# Patient Record
Sex: MALE | Race: BLACK OR AFRICAN AMERICAN | Employment: FULL TIME | URBAN - METROPOLITAN AREA
[De-identification: names, ages, dates, MRNs, and addresses within clinical notes are randomized per-mention and may not be internally consistent; named-entity substitution may affect disease eponyms.]

---

## 2022-03-18 PROBLEM — E05.90 HYPERTHYROIDISM: Status: ACTIVE | Noted: 2021-10-26

## 2022-03-19 PROBLEM — R00.2 PALPITATIONS: Status: ACTIVE | Noted: 2021-10-26

## 2022-03-20 PROBLEM — E05.00 GRAVES' DISEASE: Status: ACTIVE | Noted: 2021-10-27

## 2022-05-31 ENCOUNTER — OFFICE VISIT (OUTPATIENT)
Dept: ORTHOPEDIC SURGERY | Age: 57
End: 2022-05-31
Payer: OTHER GOVERNMENT

## 2022-05-31 VITALS — BODY MASS INDEX: 33.37 KG/M2 | WEIGHT: 260 LBS | HEIGHT: 74 IN

## 2022-05-31 DIAGNOSIS — M25.562 LEFT KNEE PAIN, UNSPECIFIED CHRONICITY: Primary | ICD-10-CM

## 2022-05-31 PROCEDURE — 99203 OFFICE O/P NEW LOW 30 MIN: CPT | Performed by: ORTHOPAEDIC SURGERY

## 2022-05-31 NOTE — PROGRESS NOTES
Name: Zana Hurtado  YOB: 1965  Gender: male  MRN: [id number]      CC: Knee Pain (L knee pain)       HPI: Zana Hurtado is a 64 y.o. male who presents with Knee Pain (L knee pain)   [facility] [place] presents today with left knee pain that began in March. He reports that he walked half a mile and then began experiencing medial sided knee pain. He does not recall any injury but does correlate the bginning of his pain with the half mile walk. He says that he has always had soreness in both knees from his time int [facility] but has not had any treatment on them. He says that his left knee is painful with ambulating and it has become difficult to walk and do day to day activities without pain. He wears a compression sleeve and takes Ibuprofen as needed. He denies a surgical history. He reports difficulty with stairs and sensation of the knee will give out. He also notes pain with twisting while swinging a golf club. ROS/Meds/PSH/PMH/FH/SH: I personally reviewed the patients standard intake form. Below are the pertinents    Tobacco:  reports that he has never smoked. He has never used smokeless tobacco.  Diabetes: none  Other: Hypertension, hyperthyroidism, hypercholesterolemia    Physical Examination:  General: no acute distress  Lungs: breathing easily  CV: regular rhythm by pulse  Left Knee: Moderate effusion present. Tenderness to palpation the medial joint line and the pes anserine bursa. Full active and passive range of motion with pain in the extreme of flexion. Negative ligamentous exam x4. Positive Danny's with reproduction of symptoms in the medial joint line. Negative bounce home test.      Imaging:   Knee XR: 4 views     Clinical Indication  1.  Left knee pain, unspecified chronicity           Report: AP, lateral, PA flexion, sunrise views of the Left knee demonstrates no acute fracture dislocation mild degenerative changes in the medial compartment    Impression: Mild DJD as above            All imaging interpreted by myself Griffin Abdalla MD independent of radiology review        Assessment:   1. Left knee pain, unspecified chronicity         Plan:   The majority of the patient's knee pain is due to a meniscal pathology. I discussed with the patient conservative treatment options to include corticosteroid injection, formal physical therapy, oral NSAIDs and advanced imaging. Based on patient's description of knee giving way, chronic swelling and clinical exam negative best to proceed with advanced imaging. I will order an MRI and he will follow-up for definitive treatment afterwards. Fay Hernandez, FARRAH dictating as a scribe for MD Ignacio Borja.  Jade Abdalla MD, [facility]

## 2022-06-01 DIAGNOSIS — E05.90 HYPERTHYROIDISM: Primary | ICD-10-CM

## 2022-06-14 DIAGNOSIS — M25.562 LEFT KNEE PAIN, UNSPECIFIED CHRONICITY: ICD-10-CM

## 2022-07-29 ENCOUNTER — TELEMEDICINE (OUTPATIENT)
Dept: ENDOCRINOLOGY | Age: 57
End: 2022-07-29
Payer: OTHER GOVERNMENT

## 2022-07-29 DIAGNOSIS — E05.90 HYPERTHYROIDISM: Primary | ICD-10-CM

## 2022-07-29 DIAGNOSIS — E05.00 GRAVES' DISEASE: ICD-10-CM

## 2022-07-29 PROCEDURE — 99213 OFFICE O/P EST LOW 20 MIN: CPT | Performed by: INTERNAL MEDICINE

## 2022-07-29 RX ORDER — BUDESONIDE AND FORMOTEROL FUMARATE DIHYDRATE 160; 4.5 UG/1; UG/1
2 AEROSOL RESPIRATORY (INHALATION) 2 TIMES DAILY
COMMUNITY
Start: 2019-12-03

## 2022-07-29 RX ORDER — DESONIDE 0.5 MG/G
CREAM TOPICAL
COMMUNITY
Start: 2022-07-06

## 2022-07-29 RX ORDER — METHIMAZOLE 10 MG/1
10 TABLET ORAL DAILY
Qty: 90 TABLET | Refills: 3
Start: 2022-07-29 | End: 2022-08-05 | Stop reason: SDUPTHER

## 2022-07-29 ASSESSMENT — ENCOUNTER SYMPTOMS
CONSTIPATION: 0
DIARRHEA: 0

## 2022-07-29 NOTE — PROGRESS NOTES
Mitchell Boyd MD, [address]            Reason for visit: Follow-up of hyperthyroidism    I was in the office while conducting this encounter. Consent:  Bere Reyes, who was evaluated through a synchronous (real-time) audio-video encounter, and/or his healthcare decision maker, is aware that it is a billable service, which includes applicable co-pays, with coverage as determined by his insurance carrier. He provided verbal consent to proceed and patient identification was verified. This visit was conducted pursuant to the emergency declaration under the [address] [place] [address], [facility] waiver authority and the [employer] Act. A caregiver was present when appropriate. Ability to conduct physical exam was limited. The patient was located at home in a state where the provider was licensed to provide care. This virtual visit was conducted via [address] Pursuant to the emergency declaration under the [address] [place] [address], [facility] authority and the [employer] Act, this Virtual  Visit was conducted to reduce the patient's risk of exposure to COVID-19 and provide continuity of care for an established patient. Services were provided through a video synchronous discussion virtually to substitute for in-person clinic visit. Due to this being a TeleHealth evaluation, many elements of the physical examination are unable to be assessed. --Ajith Santana MD on 7/29/2022 at 11:56 AM        ASSESSMENT AND PLAN:    1. Hyperthyroidism  I will have him check thyroid function at his convenience. Return in 4 to 6 months with repeat labs. - methIMAzole (TAPAZOLE) 10 MG tablet; Take 1 tablet by mouth in the morning. Dispense: 90 tablet; Refill: 3  - TSH;  Future  - T4, Free; Future  - T3; Future  - Hepatic Function Panel; Future    2. Graves' disease    Follow-up and Dispositions    Return for 4-6 months. History of Present Illness:    THYROID DYSFUNCTION  Mireille Molina is seen for follow-up of hyperthyroidism secondary to Graves' disease; this was first noted in August 2021. Current symptoms:  See review of systems below     Prior treatment: Methimazole 20 mg daily was started 10/27/2021. His dose has been adjusted as follows:  -10 mg daily 1/3/2022     Pertinent labs:  8/12/2021: TSH less than 0.005, free T4 2.50, free T3 8.0.  8/19/2021: Free T4 2.25, T4 11.3, antithyroid peroxidase antibodies 359 (+). 10/26/2021: TSH less than 0.005, free T4 2.50, T3 257, thyroid-stimulating immunoglobulin 0.77 (+). 12/30/2021: TSH less than 0.005, free T4 1.21, T3 126.  4/11/2022: TSH 2.620, free T4 1.09, T3 138. Imaging:  10/26/2021: Ultrasound ([facility])- Right lobe 2.69 x 2.24 x 6.42 cm, isthmus 0.46 cm, left lobe 1.98 x 1.95 x 5.87 cm. Heterogeneous echotexture. Increased blood flow. No nodules. Review of Systems   Constitutional:  Positive for unexpected weight change (gained 3-5 pounds in the last few months). Negative for fatigue. Cardiovascular:  Negative for palpitations. Gastrointestinal:  Negative for constipation and diarrhea. Endocrine: Positive for heat intolerance. Psychiatric/Behavioral:  Negative for sleep disturbance. There were no vitals taken for this visit. Wt Readings from Last 3 Encounters:   05/31/22 260 lb (117.9 kg)   01/03/22 262 lb (118.8 kg)   10/26/21 254 lb 9.6 oz (115.5 kg)       Physical Exam  Constitutional:       Appearance: Normal appearance. HENT:      Head: Normocephalic. Nose: Nose normal.   Eyes:      Extraocular Movements: Extraocular movements intact. Pulmonary:      Effort: Pulmonary effort is normal.   Abdominal:      General: There is no distension. Musculoskeletal:         General: Normal range of motion.       Cervical back: Normal range of motion. Skin:     Findings: No rash. Neurological:      General: No focal deficit present. Mental Status: He is alert. Mental status is at baseline. Psychiatric:         Mood and Affect: Mood normal.         Behavior: Behavior normal.       Orders Placed This Encounter   Procedures    TSH     Standing Status:   Future     Standing Expiration Date:   7/29/2023    T4, Free     Standing Status:   Future     Standing Expiration Date:   7/29/2023    T3     Standing Status:   Future     Standing Expiration Date:   7/29/2023    Hepatic Function Panel     Standing Status:   Future     Standing Expiration Date:   7/29/2023    TSH     Standing Status:   Future     Standing Expiration Date:   7/29/2023    T4, Free     Standing Status:   Future     Standing Expiration Date:   7/29/2023    T3     Standing Status:   Future     Standing Expiration Date:   7/29/2023    Hepatic Function Panel     Standing Status:   Future     Standing Expiration Date:   7/29/2023         Current Outpatient Medications   Medication Sig Dispense Refill    budesonide-formoterol (SYMBICORT) 160-4.5 MCG/ACT AERO Inhale 2 puffs into the lungs in the morning and 2 puffs before bedtime. desonide (DESOWEN) 0.05 % cream       methIMAzole (TAPAZOLE) 10 MG tablet Take 1 tablet by mouth in the morning. 90 tablet 3    calcipotriene-betamethasone (TACLONEX) 0.005-0.064 % ointment Apply to affected area twice daily; avoid face, groin, & underarms      Cholecalciferol 50 MCG (2000 UT) TABS Take by mouth      pimecrolimus (ELIDEL) 1 % cream Apply to affected areas of face, groin, and/or underarms twice daily      rosuvastatin (CRESTOR) 5 MG tablet TAKE 1 TABLET DAILY      ustekinumab (STELARA) 90 MG/ML SOSY prefilled syringe Inject 90 mg into the skin once as needed      valsartan-hydroCHLOROthiazide (DIOVAN-HCT) 160-25 MG per tablet TAKE 1 TABLET DAILY FOR HYPERTENSION       No current facility-administered medications for this visit.

## 2022-08-04 ENCOUNTER — NURSE ONLY (OUTPATIENT)
Dept: ENDOCRINOLOGY | Age: 57
End: 2022-08-04

## 2022-08-04 DIAGNOSIS — E05.90 HYPERTHYROIDISM: ICD-10-CM

## 2022-08-04 LAB
ALBUMIN SERPL-MCNC: 3.8 G/DL (ref 3.5–5)
ALBUMIN/GLOB SERPL: 1.1 (ref 1.2–3.5)
ALP SERPL-CCNC: 66 U/L (ref 50–136)
ALT SERPL-CCNC: 34 U/L (ref 12–65)
AST SERPL-CCNC: 17 U/L (ref 15–37)
BILIRUB DIRECT SERPL-MCNC: 0.4 MG/DL
BILIRUB SERPL-MCNC: 1.8 MG/DL (ref 0.2–1.1)
GLOBULIN SER CALC-MCNC: 3.4 G/DL (ref 2.3–3.5)
PROT SERPL-MCNC: 7.2 G/DL (ref 6.3–8.2)
T3 SERPL-MCNC: 1.27 NG/ML (ref 0.6–1.81)
T4 FREE SERPL-MCNC: 1.2 NG/DL (ref 0.78–1.46)
TSH, 3RD GENERATION: 1.32 UIU/ML (ref 0.36–3.74)

## 2022-08-05 DIAGNOSIS — E05.90 HYPERTHYROIDISM: ICD-10-CM

## 2022-08-05 RX ORDER — METHIMAZOLE 10 MG/1
10 TABLET ORAL DAILY
Qty: 90 TABLET | Refills: 3 | Status: SHIPPED | OUTPATIENT
Start: 2022-08-05

## 2023-09-11 ENCOUNTER — TELEPHONE (OUTPATIENT)
Dept: ENDOCRINOLOGY | Age: 58
End: 2023-09-11

## 2023-09-11 DIAGNOSIS — E05.90 HYPERTHYROIDISM: ICD-10-CM

## 2023-09-15 RX ORDER — METHIMAZOLE 10 MG/1
TABLET ORAL
Qty: 90 TABLET | Refills: 3 | OUTPATIENT
Start: 2023-09-15

## 2023-12-20 DIAGNOSIS — E05.90 HYPERTHYROIDISM: ICD-10-CM

## 2023-12-20 LAB
ALBUMIN SERPL-MCNC: 4.1 G/DL (ref 3.5–5)
ALBUMIN/GLOB SERPL: 1.3 (ref 0.4–1.6)
ALP SERPL-CCNC: 83 U/L (ref 50–136)
ALT SERPL-CCNC: 48 U/L (ref 12–65)
AST SERPL-CCNC: 31 U/L (ref 15–37)
BILIRUB DIRECT SERPL-MCNC: 0.3 MG/DL
BILIRUB SERPL-MCNC: 1.3 MG/DL (ref 0.2–1.1)
GLOBULIN SER CALC-MCNC: 3.2 G/DL (ref 2.8–4.5)
PROT SERPL-MCNC: 7.3 G/DL (ref 6.3–8.2)
T4 FREE SERPL-MCNC: 1.1 NG/DL (ref 0.78–1.46)
TSH, 3RD GENERATION: 1.08 UIU/ML (ref 0.36–3.74)

## 2023-12-21 LAB — T3 SERPL-MCNC: 1.22 NG/ML (ref 0.6–1.81)

## 2024-06-18 DIAGNOSIS — E05.90 HYPERTHYROIDISM: ICD-10-CM

## 2024-06-18 LAB
ALBUMIN SERPL-MCNC: 3.9 G/DL (ref 3.5–5)
ALBUMIN/GLOB SERPL: 1.2 (ref 1–1.9)
ALP SERPL-CCNC: 65 U/L (ref 40–129)
ALT SERPL-CCNC: 35 U/L (ref 12–65)
AST SERPL-CCNC: 30 U/L (ref 15–37)
BILIRUB DIRECT SERPL-MCNC: 0.4 MG/DL (ref 0–0.4)
BILIRUB SERPL-MCNC: 1.6 MG/DL (ref 0–1.2)
GLOBULIN SER CALC-MCNC: 3.2 G/DL (ref 2.3–3.5)
PROT SERPL-MCNC: 7.1 G/DL (ref 6.3–8.2)
T3 SERPL-MCNC: 1.26 NG/ML (ref 0.6–1.81)
T4 FREE SERPL-MCNC: 1.2 NG/DL (ref 0.9–1.7)
TSH, 3RD GENERATION: 1.91 UIU/ML (ref 0.27–4.2)

## 2024-06-20 ENCOUNTER — OFFICE VISIT (OUTPATIENT)
Dept: ENDOCRINOLOGY | Age: 59
End: 2024-06-20
Payer: OTHER GOVERNMENT

## 2024-06-20 VITALS
WEIGHT: 276 LBS | DIASTOLIC BLOOD PRESSURE: 92 MMHG | SYSTOLIC BLOOD PRESSURE: 136 MMHG | HEART RATE: 76 BPM | OXYGEN SATURATION: 96 % | BODY MASS INDEX: 35.44 KG/M2

## 2024-06-20 DIAGNOSIS — E05.00 GRAVES' DISEASE: ICD-10-CM

## 2024-06-20 DIAGNOSIS — E05.90 HYPERTHYROIDISM: Primary | ICD-10-CM

## 2024-06-20 PROCEDURE — 3080F DIAST BP >= 90 MM HG: CPT | Performed by: INTERNAL MEDICINE

## 2024-06-20 PROCEDURE — 3075F SYST BP GE 130 - 139MM HG: CPT | Performed by: INTERNAL MEDICINE

## 2024-06-20 PROCEDURE — 99214 OFFICE O/P EST MOD 30 MIN: CPT | Performed by: INTERNAL MEDICINE

## 2024-06-20 RX ORDER — METHIMAZOLE 10 MG/1
10 TABLET ORAL DAILY
Qty: 90 TABLET | Refills: 3
Start: 2024-06-20

## 2024-06-20 RX ORDER — AMLODIPINE BESYLATE 5 MG/1
TABLET ORAL
COMMUNITY
Start: 2024-06-03

## 2024-06-20 ASSESSMENT — ENCOUNTER SYMPTOMS
VOICE CHANGE: 0
TROUBLE SWALLOWING: 0
CONSTIPATION: 0
DIARRHEA: 0

## 2024-12-19 DIAGNOSIS — E05.90 HYPERTHYROIDISM: ICD-10-CM

## 2024-12-19 LAB
ALBUMIN SERPL-MCNC: 4 G/DL (ref 3.5–5)
ALBUMIN/GLOB SERPL: 1.2 (ref 1–1.9)
ALP SERPL-CCNC: 68 U/L (ref 40–129)
ALT SERPL-CCNC: 34 U/L (ref 8–55)
AST SERPL-CCNC: 27 U/L (ref 15–37)
BILIRUB DIRECT SERPL-MCNC: 0.3 MG/DL (ref 0–0.4)
BILIRUB SERPL-MCNC: 1.2 MG/DL (ref 0–1.2)
GLOBULIN SER CALC-MCNC: 3.3 G/DL (ref 2.3–3.5)
PROT SERPL-MCNC: 7.3 G/DL (ref 6.3–8.2)
T3 SERPL-MCNC: 1.4 NG/ML (ref 0.6–1.81)
T4 FREE SERPL-MCNC: 1.5 NG/DL (ref 0.9–1.7)
TSH, 3RD GENERATION: 1 UIU/ML (ref 0.27–4.2)

## 2024-12-23 ASSESSMENT — ENCOUNTER SYMPTOMS
CONSTIPATION: 0
VOICE CHANGE: 0
DIARRHEA: 0
TROUBLE SWALLOWING: 0

## 2024-12-23 NOTE — PROGRESS NOTES
ARAVIND Boles MD, FACE    [facility] ENDOCRINOLOGY   AND   THYROID NODULE CLINIC            Reason for visit: Follow-up of hyperthyroidism        ASSESSMENT AND PLAN:    1. Hyperthyroidism  Thyroid function is normal.  Continue methimazole 10 mg daily.  Return in 6 months with repeat labs.  - methIMAzole (TAPAZOLE) 10 MG tablet; Take 1 tablet by mouth in the morning.  Dispense: 90 tablet; Refill: 3  - TSH; Future  - T4, Free; Future  - T3; Future  - Hepatic Function Panel; Future    2. Graves' disease    Follow-up and Dispositions    Return in about 6 months (around 6/24/2025).             History of Present Illness:    THYROID DYSFUNCTION  Bridget Ann is seen for follow-up of hyperthyroidism secondary to Graves' disease; this was first noted in August 2021.  He is currently treated with methimazole.      Current symptoms:  See review of systems below     Prior treatment: Methimazole 20 mg daily was started 10/27/2021.  His dose has been adjusted as follows:  -10 mg daily 1/3/2022     Pertinent labs:  8/12/2021: TSH less than 0.005, free T4 2.50, free T3 8.0.  8/19/2021: Free T4 2.25, T4 11.3, antithyroid peroxidase antibodies 359 (+).  10/26/2021: TSH less than 0.005, free T4 2.50, T3 257, thyroid-stimulating immunoglobulin 0.77 (+).  12/30/2021: TSH less than 0.005, free T4 1.21, T3 126.  4/11/2022: TSH 2.620, free T4 1.09, T3 138.  8/4/2022: TSH 1.320, free T4 1.2, T3 1.27.  12/20/2023: TSH 1.080, free T4 1.1, T3 1.22.  6/18/2024: TSH 1.910, free T4 1.2, T3 1.26.  12/19/2024: TSH 1.000, free T4 1.5, T3 1.40.     Imaging:  10/26/2021: Ultrasound ([facility])- Right lobe 2.69 x 2.24 x 6.42 cm, isthmus 0.46 cm, left lobe 1.98 x 1.95 x 5.87 cm.  Heterogeneous echotexture.  Increased blood flow.  No nodules.       Review of Systems   Constitutional:  Positive for fatigue (mild) and unexpected weight change (previously gained ~15 pounds in 18 months; stable over the last 12 months).   HENT:  Negative for trouble

## 2024-12-24 ENCOUNTER — OFFICE VISIT (OUTPATIENT)
Dept: ENDOCRINOLOGY | Age: 59
End: 2024-12-24
Payer: OTHER GOVERNMENT

## 2024-12-24 VITALS
OXYGEN SATURATION: 96 % | BODY MASS INDEX: 35.7 KG/M2 | WEIGHT: 278.2 LBS | HEART RATE: 81 BPM | SYSTOLIC BLOOD PRESSURE: 142 MMHG | HEIGHT: 74 IN | DIASTOLIC BLOOD PRESSURE: 100 MMHG

## 2024-12-24 DIAGNOSIS — E05.90 HYPERTHYROIDISM: Primary | ICD-10-CM

## 2024-12-24 DIAGNOSIS — E05.00 GRAVES' DISEASE: ICD-10-CM

## 2024-12-24 PROCEDURE — 3080F DIAST BP >= 90 MM HG: CPT | Performed by: INTERNAL MEDICINE

## 2024-12-24 PROCEDURE — 3077F SYST BP >= 140 MM HG: CPT | Performed by: INTERNAL MEDICINE

## 2024-12-24 PROCEDURE — 99214 OFFICE O/P EST MOD 30 MIN: CPT | Performed by: INTERNAL MEDICINE

## 2024-12-24 RX ORDER — METHIMAZOLE 10 MG/1
10 TABLET ORAL DAILY
Qty: 90 TABLET | Refills: 3 | Status: SHIPPED | OUTPATIENT
Start: 2024-12-24

## 2025-06-16 ENCOUNTER — OFFICE VISIT (OUTPATIENT)
Dept: ENDOCRINOLOGY | Age: 60
End: 2025-06-16
Payer: OTHER GOVERNMENT

## 2025-06-16 VITALS
WEIGHT: 276 LBS | SYSTOLIC BLOOD PRESSURE: 123 MMHG | HEART RATE: 88 BPM | OXYGEN SATURATION: 98 % | BODY MASS INDEX: 35.42 KG/M2 | HEIGHT: 74 IN | DIASTOLIC BLOOD PRESSURE: 83 MMHG

## 2025-06-16 DIAGNOSIS — E05.90 HYPERTHYROIDISM: ICD-10-CM

## 2025-06-16 DIAGNOSIS — E05.90 HYPERTHYROIDISM: Primary | ICD-10-CM

## 2025-06-16 DIAGNOSIS — E05.00 GRAVES' DISEASE: ICD-10-CM

## 2025-06-16 LAB
ALBUMIN SERPL-MCNC: 4 G/DL (ref 3.5–5)
ALBUMIN/GLOB SERPL: 1.1 (ref 1–1.9)
ALP SERPL-CCNC: 78 U/L (ref 40–129)
ALT SERPL-CCNC: 25 U/L (ref 8–55)
AST SERPL-CCNC: 27 U/L (ref 15–37)
BILIRUB DIRECT SERPL-MCNC: 0.3 MG/DL (ref 0–0.3)
BILIRUB SERPL-MCNC: 0.9 MG/DL (ref 0–1.2)
GLOBULIN SER CALC-MCNC: 3.6 G/DL (ref 2.3–3.5)
PROT SERPL-MCNC: 7.6 G/DL (ref 6.3–8.2)
T3 SERPL-MCNC: 1.35 NG/ML (ref 0.6–1.81)
T4 FREE SERPL-MCNC: 1.3 NG/DL (ref 0.9–1.7)
TSH, 3RD GENERATION: 1.11 UIU/ML (ref 0.27–4.2)

## 2025-06-16 PROCEDURE — 99214 OFFICE O/P EST MOD 30 MIN: CPT | Performed by: INTERNAL MEDICINE

## 2025-06-16 PROCEDURE — 3079F DIAST BP 80-89 MM HG: CPT | Performed by: INTERNAL MEDICINE

## 2025-06-16 PROCEDURE — 3074F SYST BP LT 130 MM HG: CPT | Performed by: INTERNAL MEDICINE

## 2025-06-16 RX ORDER — METHIMAZOLE 10 MG/1
10 TABLET ORAL DAILY
Qty: 90 TABLET | Refills: 3 | Status: SHIPPED | OUTPATIENT
Start: 2025-06-16 | End: 2025-06-17 | Stop reason: SDUPTHER

## 2025-06-16 ASSESSMENT — ENCOUNTER SYMPTOMS
VOICE CHANGE: 0
DIARRHEA: 0
CONSTIPATION: 0
TROUBLE SWALLOWING: 0

## 2025-06-16 NOTE — PROGRESS NOTES
ARAVIND Boles MD, FACE    [facility] ENDOCRINOLOGY   AND   THYROID NODULE CLINIC            Reason for visit: Follow-up of hyperthyroidism        ASSESSMENT AND PLAN:    1. Hyperthyroidism  I will assess thyroid function today and notify him of results.  - methIMAzole (TAPAZOLE) 10 MG tablet; Take 1 tablet by mouth in the morning.  Dispense: 90 tablet; Refill: 3  - TSH; Future  - T4, Free; Future  - T3; Future  - Hepatic Function Panel; Future    2. Graves' disease    Follow-up and Dispositions    Return in about 6 months (around 12/16/2025).               History of Present Illness:    THYROID DYSFUNCTION  Bridget Ann is seen for follow-up of hyperthyroidism secondary to Graves' disease; this was first noted in August 2021.  He is currently treated with methimazole.      Current symptoms:  See review of systems below     Prior treatment: Methimazole 20 mg daily was started 10/27/2021.  His dose has been adjusted as follows:  -10 mg daily 1/3/2022     Pertinent labs:  8/12/2021: TSH less than 0.005, free T4 2.50, free T3 8.0.  8/19/2021: Free T4 2.25, T4 11.3, antithyroid peroxidase antibodies 359 (+).  10/26/2021: TSH less than 0.005, free T4 2.50, T3 257, thyroid-stimulating immunoglobulin 0.77 (+).  12/30/2021: TSH less than 0.005, free T4 1.21, T3 126.  4/11/2022: TSH 2.620, free T4 1.09, T3 138.  8/4/2022: TSH 1.320, free T4 1.2, T3 1.27.  12/20/2023: TSH 1.080, free T4 1.1, T3 1.22.  6/18/2024: TSH 1.910, free T4 1.2, T3 1.26.  12/19/2024: TSH 1.000, free T4 1.5, T3 1.40.     Imaging:  10/26/2021: Ultrasound ([facility])- Right lobe 2.69 x 2.24 x 6.42 cm, isthmus 0.46 cm, left lobe 1.98 x 1.95 x 5.87 cm.  Heterogeneous echotexture.  Increased blood flow.  No nodules.       Review of Systems   Constitutional:  Positive for fatigue (mild). Negative for unexpected weight change (stable over the last 18+ months).   HENT:  Negative for trouble swallowing and voice change.    Cardiovascular:  Negative for

## 2025-06-17 DIAGNOSIS — E05.90 HYPERTHYROIDISM: ICD-10-CM

## 2025-06-17 RX ORDER — METHIMAZOLE 10 MG/1
10 TABLET ORAL DAILY
Qty: 90 TABLET | Refills: 3 | Status: SHIPPED | OUTPATIENT
Start: 2025-06-17